# Patient Record
Sex: MALE | Race: WHITE | NOT HISPANIC OR LATINO | URBAN - METROPOLITAN AREA
[De-identification: names, ages, dates, MRNs, and addresses within clinical notes are randomized per-mention and may not be internally consistent; named-entity substitution may affect disease eponyms.]

---

## 2024-06-11 ENCOUNTER — OFFICE VISIT (OUTPATIENT)
Dept: PRIMARY CARE | Facility: CLINIC | Age: 24
End: 2024-06-11
Payer: COMMERCIAL

## 2024-06-11 VITALS
OXYGEN SATURATION: 97 % | HEART RATE: 92 BPM | DIASTOLIC BLOOD PRESSURE: 72 MMHG | SYSTOLIC BLOOD PRESSURE: 118 MMHG | WEIGHT: 209 LBS | BODY MASS INDEX: 27.7 KG/M2 | TEMPERATURE: 98 F | HEIGHT: 73 IN

## 2024-06-11 DIAGNOSIS — Z13.228 SCREENING FOR METABOLIC DISORDER: ICD-10-CM

## 2024-06-11 DIAGNOSIS — Z00.00 ANNUAL PHYSICAL EXAM: Primary | ICD-10-CM

## 2024-06-11 DIAGNOSIS — Z13.6 SCREENING FOR CARDIOVASCULAR CONDITION: ICD-10-CM

## 2024-06-11 DIAGNOSIS — F90.2 ADHD (ATTENTION DEFICIT HYPERACTIVITY DISORDER), COMBINED TYPE: ICD-10-CM

## 2024-06-11 DIAGNOSIS — F31.9 BIPOLAR AFFECTIVE DISORDER, REMISSION STATUS UNSPECIFIED (MULTI): ICD-10-CM

## 2024-06-11 PROCEDURE — 99385 PREV VISIT NEW AGE 18-39: CPT | Performed by: INTERNAL MEDICINE

## 2024-06-11 PROCEDURE — 1036F TOBACCO NON-USER: CPT | Performed by: INTERNAL MEDICINE

## 2024-06-11 RX ORDER — DEXMETHYLPHENIDATE HYDROCHLORIDE 10 MG/1
10 TABLET ORAL
COMMUNITY
End: 2024-06-11 | Stop reason: ALTCHOICE

## 2024-06-11 RX ORDER — DEXMETHYLPHENIDATE HYDROCHLORIDE 30 MG/1
30 CAPSULE, EXTENDED RELEASE ORAL DAILY
COMMUNITY

## 2024-06-11 ASSESSMENT — ENCOUNTER SYMPTOMS
WHEEZING: 0
ABDOMINAL PAIN: 0
NERVOUS/ANXIOUS: 0
LOSS OF SENSATION IN FEET: 0
TREMORS: 0
OCCASIONAL FEELINGS OF UNSTEADINESS: 0
PALPITATIONS: 0
SORE THROAT: 0
ARTHRALGIAS: 0
COUGH: 0
JOINT SWELLING: 0
SHORTNESS OF BREATH: 0
BLOOD IN STOOL: 0
DEPRESSION: 0
POLYPHAGIA: 0
UNEXPECTED WEIGHT CHANGE: 0
CONSTIPATION: 0
NUMBNESS: 0
FATIGUE: 0
POLYDIPSIA: 0
SINUS PAIN: 0
HEMATURIA: 0
WEAKNESS: 0

## 2024-06-11 ASSESSMENT — COLUMBIA-SUICIDE SEVERITY RATING SCALE - C-SSRS
1. IN THE PAST MONTH, HAVE YOU WISHED YOU WERE DEAD OR WISHED YOU COULD GO TO SLEEP AND NOT WAKE UP?: NO
6. HAVE YOU EVER DONE ANYTHING, STARTED TO DO ANYTHING, OR PREPARED TO DO ANYTHING TO END YOUR LIFE?: NO
2. HAVE YOU ACTUALLY HAD ANY THOUGHTS OF KILLING YOURSELF?: NO

## 2024-06-11 ASSESSMENT — PATIENT HEALTH QUESTIONNAIRE - PHQ9
2. FEELING DOWN, DEPRESSED OR HOPELESS: NOT AT ALL
SUM OF ALL RESPONSES TO PHQ9 QUESTIONS 1 AND 2: 0
1. LITTLE INTEREST OR PLEASURE IN DOING THINGS: NOT AT ALL

## 2024-06-11 ASSESSMENT — PAIN SCALES - GENERAL: PAINLEVEL: 0-NO PAIN

## 2024-06-11 NOTE — PROGRESS NOTES
"Subjective   Patient ID: Isatu Olvera is a 24 y.o. male who presents for [facility].    HPI     Has history of ADHD, follows with psychiatrist every 3 months.  Denied any complaints today    Review of Systems   Constitutional:  Negative for fatigue and unexpected weight change.   HENT:  Negative for congestion, ear pain, sinus pain and sore throat.    Respiratory:  Negative for cough, shortness of breath and wheezing.    Cardiovascular:  Negative for chest pain, palpitations and leg swelling.   Gastrointestinal:  Negative for abdominal pain, blood in stool and constipation.   Endocrine: Negative for polydipsia, polyphagia and polyuria.   Genitourinary:  Negative for hematuria and urgency.   Musculoskeletal:  Negative for arthralgias and joint swelling.   Skin:  Negative for rash.   Neurological:  Negative for tremors, syncope, weakness and numbness.   Psychiatric/Behavioral:  Positive for behavioral problems. The patient is not nervous/anxious.        Objective   /72 (BP Location: Left arm, Patient Position: Sitting, BP Cuff Size: Large adult)   Pulse 92   Temp 36.7 °C (98 °F) (Temporal)   Ht 1.854 m (6' 1\")   Wt 94.8 kg (209 lb)   SpO2 97%   BMI 27.57 kg/m²     Physical Exam  Constitutional:       General: He is not in acute distress.     Appearance: Normal appearance.   HENT:      Head: Normocephalic and atraumatic.      Right Ear: Tympanic membrane normal.      Left Ear: Tympanic membrane normal.   Eyes:      Extraocular Movements: Extraocular movements intact.      Conjunctiva/sclera: Conjunctivae normal.      Pupils: Pupils are equal, round, and reactive to light.   Cardiovascular:      Rate and Rhythm: Normal rate and regular rhythm.      Pulses: Normal pulses.      Heart sounds: No murmur heard.  Pulmonary:      Effort: Pulmonary effort is normal. No respiratory distress.      Breath sounds: Normal breath sounds. No wheezing or rales.   Abdominal:      General: Abdomen is flat. Bowel sounds " are normal.      Palpations: Abdomen is soft. There is no mass.      Tenderness: There is no abdominal tenderness. There is no guarding.   Musculoskeletal:         General: Normal range of motion.      Cervical back: Normal range of motion and neck supple.      Right lower leg: No edema.      Left lower leg: No edema.   Lymphadenopathy:      Cervical: No cervical adenopathy.   Skin:     General: Skin is warm and dry.      Findings: No lesion.   Neurological:      General: No focal deficit present.      Mental Status: He is alert and oriented to person, place, and time.      Cranial Nerves: No cranial nerve deficit.      Gait: Gait normal.   Psychiatric:         Mood and Affect: Mood normal.         Past Medical History:   Diagnosis Date    Periapical abscess without sinus     Dentoalveolar abscess         Assessment/Plan        Isatu was seen today for establish care.  Diagnoses and all orders for this visit:  Annual physical exam (Primary)  -     CBC and Auto Differential; Future  -     Comprehensive Metabolic Panel; Future  ADHD (attention deficit hyperactivity disorder), combined type  Screening for metabolic disorder  -     TSH with reflex to Free T4 if abnormal; Future  Screening for cardiovascular condition  -     Lipid Panel; Future  Bipolar affective disorder, remission status unspecified (Multi)    Stated he lost about 30 pounds in last 1 year due to changing his diet  Advised to continue with dietary changes  Denied alcohol. Smoking or drug use    Current Outpatient Medications   Medication Instructions    dexmethylphenidate XR (FOCALIN XR) 30 mg, oral, Daily

## 2025-05-15 ENCOUNTER — OFFICE VISIT (OUTPATIENT)
Dept: URGENT CARE | Age: 25
End: 2025-05-15
Payer: COMMERCIAL

## 2025-05-15 VITALS
OXYGEN SATURATION: 98 % | HEIGHT: 73 IN | TEMPERATURE: 98.2 F | WEIGHT: 225 LBS | BODY MASS INDEX: 29.82 KG/M2 | HEART RATE: 77 BPM | RESPIRATION RATE: 20 BRPM | DIASTOLIC BLOOD PRESSURE: 86 MMHG | SYSTOLIC BLOOD PRESSURE: 140 MMHG

## 2025-05-15 DIAGNOSIS — H61.23 BILATERAL IMPACTED CERUMEN: Primary | ICD-10-CM

## 2025-05-15 ASSESSMENT — ENCOUNTER SYMPTOMS
OCCASIONAL FEELINGS OF UNSTEADINESS: 0
LOSS OF SENSATION IN FEET: 0
DEPRESSION: 0

## 2025-05-15 NOTE — PROGRESS NOTES
"Subjective   Patient ID: Isatu Olvera is a 25 y.o. male. They present today with a chief complaint of Earache (LT, discomfort around ear, trouble hearing. ).    History of Present Illness    Earache       This is a 25-year-old male here for left ear complaint.  He used a Q-tip last night in his left ear.  Now feels clogged.  He has diminished hearing.  Denies nasal congestion or sneezing.  Past Medical History  Allergies as of 05/15/2025 - Reviewed 05/15/2025   Allergen Reaction Noted    Poison ivy extract Other 06/11/2024       Prescriptions Prior to Admission[1]     Medical History[2]    Surgical History[3]     reports that he has never smoked. He has never used smokeless tobacco. He reports current alcohol use. He reports that he does not use drugs.    Review of Systems  Review of Systems   HENT:  Positive for ear pain.    All other systems reviewed and are negative.                                 Objective    Vitals:    05/15/25 0833   BP: 140/86   Pulse: 77   Resp: 20   Temp: 36.8 °C (98.2 °F)   TempSrc: Oral   SpO2: 98%   Weight: 102 kg (225 lb)   Height: 1.854 m (6' 1\")     No LMP for male patient.    Physical Exam  Physical Exam:    General: Vitals noted no distress. Afebrile. Normal phonation, no stridor, no trismus    ENT: Unable to see bilateral TM secondary to cerumen impaction normal conjunctival. Eyes are PERRLA. Posterior oropharynx without exudate or swelling.     Neck: Supple.     Cardiac: Regular rate rhythm    Lungs: Good aeration throughout. No adventitious breath sounds.    Abdomen: Soft, nontender, nonsurgical throughout.     Extremities: No peripheral edema    Skin: No rash    Neuro: No gross neurological deficits      Ear Cerumen Removal    Date/Time: 5/15/2025 9:03 AM    Performed by: Ty Hill PA-C  Authorized by: Ty Hill PA-C    Consent:     Consent obtained:  Verbal    Consent given by:  Patient    Risks, benefits, and alternatives were discussed: yes      Risks discussed:  " Pain  Universal protocol:     Patient identity confirmed:  Verbally with patient  Procedure details:     Location:  L ear and R ear    Procedure type: irrigation      Procedure outcomes: cerumen removed    Post-procedure details:     Inspection:  TM intact (Some cerumen remaining in the inferior aspect the left external auditory canal)    Hearing quality:  Improved    Procedure completion:  Tolerated      Point of Care Test & Imaging Results from this visit  No results found for this visit on 05/15/25.   Imaging  No results found.    Cardiology, Vascular, and Other Imaging  No other imaging results found for the past 2 days      Diagnostic study results (if any) were reviewed by Ty Hill PA-C.    Assessment/Plan   Allergies, medications, history, and pertinent labs/EKGs/Imaging reviewed by Ty Hill PA-C.     Medical Decision Making  MDM:      Summary: This is a 25-year-old male here for ear complaint. Vital signs were reviewed. Patient is well-appearing nontoxic on exam. Differential diagnosis includes but not limited to cerumen impaction, otitis media, otitis externa.  Does have bilateral cerumen impaction and I cannot see the TMs.  This was successfully irrigated. Please see procedure note.  TMs inspected following and normal.  Advise Debrox drops as needed.  Stable for discharge. Follow-up with PCP. Return to urgent care or go to the emergency department if symptoms worsen or if new symptoms develop.    Orders and Diagnoses  There are no diagnoses linked to this encounter.    Medical Admin Record      Patient disposition: Home    Electronically signed by Ty Hill PA-C  8:50 AM           [1] (Not in a hospital admission)  [2]   Past Medical History:  Diagnosis Date    Periapical abscess without sinus     Dentoalveolar abscess   [3] No past surgical history on file.

## 2025-05-15 NOTE — PATIENT INSTRUCTIONS
"Ear wax impaction    The Basics  Written by the doctors and editors at [facility]  What is ear wax impaction?  Ear wax impaction is when ear wax builds up enough to cause symptoms. Normally, ear wax helps to protect the insides of the ears and prevents injury or infection (figure 1). But having too much ear wax can cause symptoms like trouble hearing and sometimes pain. The medical term for ear wax is \"cerumen.\"  Young children and older adults are more likely than others to have ear wax impaction.  What causes ear wax impaction?  Several different things can cause ear wax impaction:  ? Diseases that affect the ear - Some health problems can affect the shape of the inside of the ear, and make it hard for wax to move out. For example, skin problems that cause skin cells to shed a lot can lead to wax buildup in the ears.  ? Narrow ear canal (figure 2) - In some people, the ear canals are narrower than in others. These people might be more likely to have ear wax impaction. A person's ear canal can become narrower after an ear injury or after severe or multiple ear infections.  ? Changes in ear wax and lining due to aging - As people get older, their ear wax gets harder and thicker. This makes it more difficult for the wax to move out of the ear as it normally should.  ? Ear-cleaning habits - Some people try to clean their ears using cotton swabs (Q-Tips) or other tools. This can actually push the wax deeper into the ear instead of getting it out. Over time, this can cause ear wax impaction.  ? Making too much ear wax - Some people make more ear wax than others. This can happen when water gets trapped in the ear, or when the ear is injured. But some people just have a lot of ear wax for no obvious reason.  ? Using devices that go into the ear - Hearing aids, \"ear bud\"-style headphones, and ear plugs can cause ear wax impaction if they are used over a long period of time.  What are the symptoms of ear wax impaction?  The " "symptoms include:  ? Trouble hearing  ? Pain in the ear  ? Feeling like the ear is blocked or plugged  ? Hearing a ringing noise in the ear  These symptoms can happen in 1 or both ears.  Should I see a doctor or nurse?  Yes. If you or your child have any of these symptoms, see a doctor or nurse. They can check the insides of the ears to figure out if the symptoms are caused by ear wax impaction or another problem, such as an ear infection.  Should I clean my (or my child's) ears at home?  No. The insides of the ears do not usually need to be cleaned. Sticking anything into the ears can push the wax in deeper and cause impaction.  \"Ear candling\" involves lighting 1 end of a hollow candle, and putting the other end in the ear. Ear candling is not recommended for ear wax removal. It does not remove ear wax and can even cause ear injuries and burns.  How is ear wax impaction treated?  There are several treatments to remove impacted ear wax. Doctors and nurses offer these treatments only to people who have bothersome symptoms. They do not recommend treatments for removing ear wax in people who have no symptoms, even if they have ear wax impaction.  In some cases, doctors and nurses will remove ear wax in people whose ears are impacted and who aren't able to let others know if they have symptoms or not. This can include young children, and people who are confused or have trouble communicating, including some older adults.  There are several different ways to remove ear wax:  ? Ear drops - Special ear drops can soften ear wax and help it to drain out. Ear drops are not usually safe for people with an ear infection or damage to the eardrum.  ? Rinsing - In some cases, a doctor or nurse can remove impacted ear wax by squirting water (or another liquid) into the ear to rinse it out.  ? Special tools - A doctor or nurse might use a special tool to remove ear wax. There are different types of tools that can do this safely. " These include small sticks, hooks, and spoons. There are also tools that use suction to pull the wax out.  Can ear wax impaction be prevented?  It depends. If you have repeated problems with ear wax impaction, talk to your doctor or nurse. They might be able to suggest ways to help prevent future impactions. For example, they might suggest using mineral oil to soften the ear wax, removing hearing aids overnight if you use them, or getting your ears cleaned regularly by a doctor or nurse.  What problems should I watch for?  Call for advice if:  ? You have signs of infection - These include fever of 100.4°F (38°C) or higher or chills.  ? Your ear is bleeding or draining pus.  ? You have pain, ringing in the ear, or hearing loss that is new or worse than before.  ? Your symptoms are not getting better after a few days, if you are using ear drop treatments.  All topics are updated as new evidence becomes available and our peer review process is complete.